# Patient Record
Sex: FEMALE | Race: OTHER | URBAN - METROPOLITAN AREA
[De-identification: names, ages, dates, MRNs, and addresses within clinical notes are randomized per-mention and may not be internally consistent; named-entity substitution may affect disease eponyms.]

---

## 2021-04-19 ENCOUNTER — APPOINTMENT (RX ONLY)
Dept: URBAN - METROPOLITAN AREA CLINIC 45 | Facility: CLINIC | Age: 81
Setting detail: DERMATOLOGY
End: 2021-04-19

## 2021-04-19 DIAGNOSIS — D17 BENIGN LIPOMATOUS NEOPLASM: ICD-10-CM | Status: INADEQUATELY CONTROLLED

## 2021-04-19 PROBLEM — D48.5 NEOPLASM OF UNCERTAIN BEHAVIOR OF SKIN: Status: ACTIVE | Noted: 2021-04-19

## 2021-04-19 PROCEDURE — ? COUNSELING

## 2021-04-19 PROCEDURE — ? FULL BODY SKIN EXAM - DECLINED

## 2021-04-19 PROCEDURE — 11104 PUNCH BX SKIN SINGLE LESION: CPT

## 2021-04-19 PROCEDURE — ? BIOPSY BY PUNCH METHOD

## 2021-04-19 PROCEDURE — ? ADDITIONAL NOTES

## 2021-04-19 ASSESSMENT — LOCATION ZONE DERM: LOCATION ZONE: TRUNK

## 2021-04-19 ASSESSMENT — LOCATION SIMPLE DESCRIPTION DERM: LOCATION SIMPLE: RIGHT BUTTOCK

## 2021-04-19 ASSESSMENT — LOCATION DETAILED DESCRIPTION DERM: LOCATION DETAILED: RIGHT LATERAL BUTTOCK

## 2021-05-03 ENCOUNTER — APPOINTMENT (RX ONLY)
Dept: URBAN - METROPOLITAN AREA CLINIC 45 | Facility: CLINIC | Age: 81
Setting detail: DERMATOLOGY
End: 2021-05-03

## 2021-05-03 DIAGNOSIS — Z48.02 ENCOUNTER FOR REMOVAL OF SUTURES: ICD-10-CM

## 2021-05-03 PROCEDURE — ? SUTURE REMOVAL (GLOBAL PERIOD)

## 2021-05-03 PROCEDURE — 99024 POSTOP FOLLOW-UP VISIT: CPT

## 2021-05-03 ASSESSMENT — LOCATION DETAILED DESCRIPTION DERM: LOCATION DETAILED: RIGHT LATERAL BUTTOCK

## 2021-05-03 ASSESSMENT — LOCATION ZONE DERM: LOCATION ZONE: TRUNK

## 2021-05-03 ASSESSMENT — LOCATION SIMPLE DESCRIPTION DERM: LOCATION SIMPLE: RIGHT BUTTOCK

## 2021-05-03 NOTE — PROCEDURE: SUTURE REMOVAL (GLOBAL PERIOD)
Detail Level: Detailed
Add 53267 Cpt? (Important Note: In 2017 The Use Of 60239 Is Being Tracked By Cms To Determine Future Global Period Reimbursement For Global Periods): yes

## 2022-11-01 ENCOUNTER — APPOINTMENT (RX ONLY)
Dept: URBAN - METROPOLITAN AREA CLINIC 45 | Facility: CLINIC | Age: 82
Setting detail: DERMATOLOGY
End: 2022-11-01

## 2022-11-01 DIAGNOSIS — T07XXXA ABRASION OR FRICTION BURN OF OTHER, MULTIPLE, AND UNSPECIFIED SITES, WITHOUT MENTION OF INFECTION: ICD-10-CM

## 2022-11-01 DIAGNOSIS — D17 BENIGN LIPOMATOUS NEOPLASM: ICD-10-CM

## 2022-11-01 PROBLEM — D17.0 BENIGN LIPOMATOUS NEOPLASM OF SKIN AND SUBCUTANEOUS TISSUE OF HEAD, FACE AND NECK: Status: ACTIVE | Noted: 2022-11-01

## 2022-11-01 PROBLEM — D17.1 BENIGN LIPOMATOUS NEOPLASM OF SKIN AND SUBCUTANEOUS TISSUE OF TRUNK: Status: ACTIVE | Noted: 2022-11-01

## 2022-11-01 PROBLEM — S50.812A ABRASION OF LEFT FOREARM, INITIAL ENCOUNTER: Status: ACTIVE | Noted: 2022-11-01

## 2022-11-01 PROCEDURE — ? COUNSELING

## 2022-11-01 PROCEDURE — ? PRESCRIPTION MEDICATION MANAGEMENT

## 2022-11-01 PROCEDURE — ? PRESCRIPTION

## 2022-11-01 PROCEDURE — 99213 OFFICE O/P EST LOW 20 MIN: CPT

## 2022-11-01 PROCEDURE — ? ADDITIONAL NOTES

## 2022-11-01 RX ORDER — MUPIROCIN 20 MG/G
OINTMENT TOPICAL BID
Qty: 22 | Refills: 0 | Status: ERX | COMMUNITY
Start: 2022-11-01

## 2022-11-01 RX ADMIN — MUPIROCIN: 20 OINTMENT TOPICAL at 00:00

## 2022-11-01 ASSESSMENT — LOCATION SIMPLE DESCRIPTION DERM
LOCATION SIMPLE: TRAPEZIAL NECK
LOCATION SIMPLE: LEFT FOREARM
LOCATION SIMPLE: RIGHT BUTTOCK

## 2022-11-01 ASSESSMENT — LOCATION ZONE DERM
LOCATION ZONE: TRUNK
LOCATION ZONE: ARM
LOCATION ZONE: NECK

## 2022-11-01 ASSESSMENT — LOCATION DETAILED DESCRIPTION DERM
LOCATION DETAILED: RIGHT BUTTOCK
LOCATION DETAILED: MID TRAPEZIAL NECK
LOCATION DETAILED: LEFT DISTAL RADIAL DORSAL FOREARM

## 2022-11-01 NOTE — HPI: EVALUATION OF SKIN LESION(S)
What Type Of Note Output Would You Prefer (Optional)?: Bullet Format
Hpi Title: Evaluation of a Skin Lesion
How Severe Are Your Spot(S)?: mild
Have Your Spot(S) Been Treated In The Past?: has not been treated
Additional History: Established patient last seen by [facility] 5/21\\nPatient decline fbe\\nAreas of concern on nose, buttocks, and back of neck\\nPatient has lesion on left forearm (drain fly-fruit fly)

## 2022-11-01 NOTE — PROCEDURE: PRESCRIPTION MEDICATION MANAGEMENT
Detail Level: Zone
Initiate Treatment: mupirocin 2 % topical ointment Bid\\nQuantity: 22.0 g  Days Supply: 30\\nSig: Apply to aa of wound on forearm twice daily
Render In Strict Bullet Format?: No

## 2022-11-01 NOTE — PROCEDURE: ADDITIONAL NOTES
Additional Notes: Patient consent was obtained to proceed with the visit and recommended plan of care after discussion of all risks and benefits, including the risks of COVID-19 exposure.
Detail Level: Simple
Additional Notes: Discussed with patient that lipoma would be monitored
Render Risk Assessment In Note?: yes

## 2023-01-01 ENCOUNTER — DASHBOARD ENCOUNTERS (OUTPATIENT)
Age: 83
End: 2023-01-01

## 2023-01-01 ENCOUNTER — WEB ENCOUNTER (OUTPATIENT)
Dept: URBAN - NONMETROPOLITAN AREA CLINIC 4 | Facility: CLINIC | Age: 83
End: 2023-01-01

## 2023-01-01 ENCOUNTER — OFFICE VISIT (OUTPATIENT)
Dept: URBAN - NONMETROPOLITAN AREA CLINIC 4 | Facility: CLINIC | Age: 83
End: 2023-01-01
Payer: MEDICARE

## 2023-01-01 ENCOUNTER — LAB OUTSIDE AN ENCOUNTER (OUTPATIENT)
Dept: URBAN - NONMETROPOLITAN AREA CLINIC 4 | Facility: CLINIC | Age: 83
End: 2023-01-01

## 2023-01-01 ENCOUNTER — TELEPHONE ENCOUNTER (OUTPATIENT)
Dept: URBAN - METROPOLITAN AREA CLINIC 54 | Facility: CLINIC | Age: 83
End: 2023-01-01

## 2023-01-01 ENCOUNTER — CLAIMS CREATED FROM THE CLAIM WINDOW (OUTPATIENT)
Dept: URBAN - METROPOLITAN AREA MEDICAL CENTER 24 | Facility: MEDICAL CENTER | Age: 83
End: 2023-01-01
Payer: MEDICARE

## 2023-01-01 VITALS
SYSTOLIC BLOOD PRESSURE: 132 MMHG | HEIGHT: 61 IN | BODY MASS INDEX: 28.96 KG/M2 | DIASTOLIC BLOOD PRESSURE: 62 MMHG | HEART RATE: 82 BPM | WEIGHT: 153.4 LBS | TEMPERATURE: 97.9 F

## 2023-01-01 VITALS
HEIGHT: 61 IN | TEMPERATURE: 97.7 F | WEIGHT: 141 LBS | BODY MASS INDEX: 26.62 KG/M2 | DIASTOLIC BLOOD PRESSURE: 77 MMHG | SYSTOLIC BLOOD PRESSURE: 152 MMHG | HEART RATE: 103 BPM

## 2023-01-01 DIAGNOSIS — K74.60 UNSPECIFIED CIRRHOSIS OF LIVER: ICD-10-CM

## 2023-01-01 DIAGNOSIS — K62.5 BRBPR (BRIGHT RED BLOOD PER RECTUM): ICD-10-CM

## 2023-01-01 DIAGNOSIS — R19.00 ABDOMINAL SWELLING: ICD-10-CM

## 2023-01-01 DIAGNOSIS — I85.10 SECONDARY ESOPHAGEAL VARICES WITHOUT BLEEDING: ICD-10-CM

## 2023-01-01 DIAGNOSIS — D64.89 ANEMIA DUE TO OTHER CAUSE: ICD-10-CM

## 2023-01-01 DIAGNOSIS — K75.81 NONALCOHOLIC STEATOHEPATITIS (NASH): ICD-10-CM

## 2023-01-01 DIAGNOSIS — D50.9 IRON DEFICIENCY ANEMIA, UNSPECIFIED IRON DEFICIENCY ANEMIA TYPE: ICD-10-CM

## 2023-01-01 DIAGNOSIS — R18.8 OTHER ASCITES: ICD-10-CM

## 2023-01-01 DIAGNOSIS — K74.69 CIRRHOSIS, CRYPTOGENIC: ICD-10-CM

## 2023-01-01 DIAGNOSIS — R18.8 ABDOMINAL ASCITES: ICD-10-CM

## 2023-01-01 DIAGNOSIS — K86.1 CHRONIC PANCREATITIS, UNSPECIFIED PANCREATITIS TYPE: ICD-10-CM

## 2023-01-01 LAB
A/G RATIO: 0.9
A/G RATIO: 1
ABSOLUTE BASOPHILS: 52
ABSOLUTE EOSINOPHILS: 244
ABSOLUTE LYMPHOCYTES: 1496
ABSOLUTE MONOCYTES: 539
ABSOLUTE NEUTROPHILS: 3468
AFP, SERUM, TUMOR MARKER: 2.1
ALBUMIN: 2.7
ALBUMIN: 2.7
ALKALINE PHOSPHATASE: 132
ALKALINE PHOSPHATASE: 141
ALT (SGPT): 15
ALT (SGPT): 16
AMMONIA (P): 56
AST (SGOT): 35
AST (SGOT): 38
BASOPHILS: 0.9
BILIRUBIN, TOTAL: 0.6
BILIRUBIN, TOTAL: 0.7
BUN/CREATININE RATIO: 13
BUN/CREATININE RATIO: 14
BUN: 14
BUN: 14
CALCIUM: 8.2
CALCIUM: 8.5
CARBON DIOXIDE, TOTAL: 27
CARBON DIOXIDE, TOTAL: 31
CHLORIDE: 106
CHLORIDE: 98
CREATININE: 0.99
CREATININE: 1.06
EGFR: 53
EGFR: 57
EOSINOPHILS: 4.2
FERRITIN, SERUM: 11
GLOBULIN, TOTAL: 2.8
GLOBULIN, TOTAL: 3.1
GLUCOSE: 122
GLUCOSE: 175
HEMATOCRIT: 27.6
HEMATOCRIT: 32
HEMOGLOBIN: 10
HEMOGLOBIN: 8.7
INR: 1.1
INR: 1.2
IRON BIND.CAP.(TIBC): 321
IRON SATURATION: 10
IRON: 32
LYMPHOCYTES: 25.8
MCH: 27.5
MCH: 28.1
MCHC: 31.3
MCHC: 31.5
MCV: 87.9
MCV: 89
MONOCYTES: 9.3
MPV: 10.4
MPV: 9.9
NEUTROPHILS: 59.8
PLATELET COUNT: 183
PLATELET COUNT: 193
POTASSIUM: 3.8
POTASSIUM: 4.9
PROTEIN, TOTAL: 5.5
PROTEIN, TOTAL: 5.8
PT: 11.6
PT: 12.2
RDW: 14.3
RDW: 15.4
RED BLOOD CELL COUNT: 3.1
RED BLOOD CELL COUNT: 3.64
SODIUM: 137
SODIUM: 140
WHITE BLOOD CELL COUNT: 5.8
WHITE BLOOD CELL COUNT: 6.1

## 2023-01-01 PROCEDURE — 99214 OFFICE O/P EST MOD 30 MIN: CPT | Performed by: INTERNAL MEDICINE

## 2023-01-01 PROCEDURE — 99254 IP/OBS CNSLTJ NEW/EST MOD 60: CPT | Performed by: INTERNAL MEDICINE

## 2023-01-01 PROCEDURE — 99205 OFFICE O/P NEW HI 60 MIN: CPT | Performed by: REGISTERED NURSE

## 2023-01-01 PROCEDURE — G8427 DOCREV CUR MEDS BY ELIG CLIN: HCPCS | Performed by: INTERNAL MEDICINE

## 2023-01-01 PROCEDURE — 99222 1ST HOSP IP/OBS MODERATE 55: CPT | Performed by: INTERNAL MEDICINE

## 2023-01-01 RX ORDER — FUROSEMIDE 20 MG/1
1 TABLET TABLET ORAL ONCE A DAY
Qty: 30 | Refills: 2 | Status: ACTIVE | COMMUNITY
Start: 2023-01-01

## 2023-01-01 RX ORDER — GABAPENTIN 100 MG/1
TAKE 3 CAPSULES (300 MG) BY ORAL ROUTE ONCE DAILY AT BEDTIME CAPSULE ORAL 1
Qty: 0 | Refills: 0 | Status: ACTIVE | COMMUNITY
Start: 1900-01-01

## 2023-01-01 RX ORDER — SPIRONOLACTONE 50 MG/1
1 TABLET TABLET, FILM COATED ORAL ONCE A DAY
Qty: 30 | Refills: 2 | OUTPATIENT
Start: 2023-01-01 | End: 2024-02-29

## 2023-01-01 RX ORDER — SPIRONOLACTONE 50 MG/1
1 TABLET TABLET, FILM COATED ORAL ONCE A DAY
Qty: 30 | Refills: 2 | Status: ACTIVE | COMMUNITY
Start: 2023-01-01 | End: 2024-02-29

## 2023-01-01 RX ORDER — FUROSEMIDE 20 MG/1
1 TABLET TABLET ORAL ONCE A DAY
Qty: 30 | Refills: 2 | OUTPATIENT
Start: 2023-01-01

## 2023-01-01 RX ORDER — HYDROXYZINE HYDROCHLORIDE 10 MG/1
AS DIRECTED ORALLY EVERY 12 HOURS AS NEEDED FOR ITCH TABLET, FILM COATED ORAL
Qty: 60 EACH | Refills: 0 | Status: ACTIVE | COMMUNITY

## 2023-11-17 PROBLEM — 266468003: Status: ACTIVE | Noted: 2023-01-01

## 2023-11-17 NOTE — HPI-TODAY'S VISIT:
11/17/23: Pt is an 82 yo female with PMH of MELISSA cirrhosis with EV's and PHG, anemia, HTN, DM, HLD, CVA who presents to establish GI care.   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 She was preveiously followed by Dr. Dexter Delaney. She had upper GI bleeding in March 2023 and underwent EGD on 3/14/23 that revealed small grade I EV's with no stigmata, not requiring endoscopic therapy, a gastric varix with no stigmata and PHG and nodular antral mucosal. She was prescribed PPI and beta blocker. She was seen by her PCP in August and had a positive FOBT. She was recently admitted to [facility] in August 2023 for anemia and fatigue. Hgb 9.5 upon admit, down to 8.9. Iron studies c/w iron deficiency (Iron 48, iron sat 12, transferrin 291, ferritin 24.5). No overt GI bleeding reported. MELD score was 10 upon admission. CT scan showed mild inflammatory changes in the head of the pancreas with pancreatic calcifications and possible small pseudocyst. Lipase 334. She was seen by Dr. Zandra Yeh with [facility] and started on iron supplement. Hgb was 8.9 upon discharge on 9/1/23.   Since discharge, she continues to c/o fatigue. She is not currently taking iron supplement. Remains in carvedalol and pantoprazle. Reports intermittent BRBPR. Denies melena. Typically has a BM daily. She reports abdominal swelling. She has gaied approx 8-10 lbs since discharge. Per labs at PCP on 9/5/23, Hgb 9.2. She is living with her son and his wife who is a nurse. Daughter Yamil who is POA called in for consultation today.

## 2023-11-20 PROBLEM — 235494005: Status: ACTIVE | Noted: 2023-01-01

## 2023-11-20 PROBLEM — 87522002: Status: ACTIVE | Noted: 2023-01-01

## 2023-12-20 PROBLEM — 389026000: Status: ACTIVE | Noted: 2023-01-01

## 2023-12-20 PROBLEM — 14223005: Status: ACTIVE | Noted: 2023-01-01

## 2023-12-20 NOTE — HPI-TODAY'S VISIT:
11/17/23: Pt is an 82 yo female with PMH of MELISSA cirrhosis with EV's and PHG, anemia, HTN, DM, HLD, CVA who presents to establish GI care.   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 She was preveiously followed by Dr. Dexter Delaney. She had upper GI bleeding in March 2023 and underwent EGD on 3/14/23 that revealed small grade I EV's with no stigmata, not requiring endoscopic therapy, a gastric varix with no stigmata and PHG and nodular antral mucosal. She was prescribed PPI and beta blocker. She was seen by her PCP in August and had a positive FOBT. She was recently admitted to [facility] in August 2023 for anemia and fatigue. Hgb 9.5 upon admit, down to 8.9. Iron studies c/w iron deficiency (Iron 48, iron sat 12, transferrin 291, ferritin 24.5). No overt GI bleeding reported. MELD score was 10 upon admission. CT scan showed mild inflammatory changes in the head of the pancreas with pancreatic calcifications and possible small pseudocyst. Lipase 334. She was seen by Dr. Zandra Yeh with [facility] and started on iron supplement. Hgb was 8.9 upon discharge on 9/1/23.   Since discharge, she continues to c/o fatigue. She is not currently taking iron supplement. Remains in carvedalol and pantoprazle. Reports intermittent BRBPR. Denies melena. Typically has a BM daily. She reports abdominal swelling. She has gaied approx 8-10 lbs since discharge. Per labs at PCP on 9/5/23, Hgb 9.2. She is living with her son and his wife who is a nurse. Daughter Yamil who is POA called in for consultation today.  Follow Up 12/20/23: Patient presents for routine follow up. She is brought in on a wheelchair by her son. She was admitted at [facility] on 12/9 for weakness and ascites. She had LVP of 4 L (negative for SBP). She was treated with IV lasix. She is on A50 and L20. BP is normal. Her weight is down to 141 lbs (Down from 158 in hospital). She c/o bilateral leg swelling and pruritus at night time which is not very response to Hydroxyzine. She denies GIB. Family has noted some confusion episodes. Daughter Yamil called in for consultation as well.

## 2023-12-20 NOTE — PHYSICAL EXAM CONSTITUTIONAL:
chronically ill appearing , in no acute distress , ambulating on wheelchair, normal communication ability

## 2023-12-21 PROBLEM — 19943007: Status: ACTIVE | Noted: 2023-01-01

## 2024-02-14 ENCOUNTER — OV EP (OUTPATIENT)
Dept: URBAN - NONMETROPOLITAN AREA CLINIC 4 | Facility: CLINIC | Age: 84
End: 2024-02-14